# Patient Record
Sex: FEMALE | Race: WHITE | Employment: OTHER | URBAN - METROPOLITAN AREA
[De-identification: names, ages, dates, MRNs, and addresses within clinical notes are randomized per-mention and may not be internally consistent; named-entity substitution may affect disease eponyms.]

---

## 2018-04-25 ENCOUNTER — OFFICE VISIT (OUTPATIENT)
Dept: OBGYN CLINIC | Age: 33
End: 2018-04-25
Payer: COMMERCIAL

## 2018-04-25 VITALS
SYSTOLIC BLOOD PRESSURE: 116 MMHG | DIASTOLIC BLOOD PRESSURE: 74 MMHG | HEIGHT: 67 IN | WEIGHT: 142 LBS | HEART RATE: 78 BPM | BODY MASS INDEX: 22.29 KG/M2

## 2018-04-25 DIAGNOSIS — N91.2 AMENORRHEA: Primary | ICD-10-CM

## 2018-04-25 PROBLEM — L30.9 ECZEMA: Status: ACTIVE | Noted: 2018-04-25

## 2018-04-25 LAB
CONTROL: PRESENT
PREGNANCY TEST URINE, POC: POSITIVE

## 2018-04-25 PROCEDURE — 81025 URINE PREGNANCY TEST: CPT | Performed by: ADVANCED PRACTICE MIDWIFE

## 2018-04-25 PROCEDURE — 99203 OFFICE O/P NEW LOW 30 MIN: CPT | Performed by: ADVANCED PRACTICE MIDWIFE

## 2018-05-03 ENCOUNTER — HOSPITAL ENCOUNTER (EMERGENCY)
Age: 33
Discharge: HOME | End: 2018-05-03
Attending: EMERGENCY MEDICINE
Payer: COMMERCIAL

## 2018-05-03 ENCOUNTER — APPOINTMENT (OUTPATIENT)
Dept: GENERAL RADIOLOGY | Age: 33
End: 2018-05-03
Payer: COMMERCIAL

## 2018-05-03 VITALS
RESPIRATION RATE: 16 BRPM | OXYGEN SATURATION: 98 % | TEMPERATURE: 97.2 F | SYSTOLIC BLOOD PRESSURE: 123 MMHG | HEART RATE: 75 BPM | DIASTOLIC BLOOD PRESSURE: 89 MMHG

## 2018-05-03 DIAGNOSIS — S61.411A LACERATION OF RIGHT HAND WITHOUT FOREIGN BODY, INITIAL ENCOUNTER: Primary | ICD-10-CM

## 2018-05-03 PROCEDURE — 2500000003 HC RX 250 WO HCPCS: Performed by: EMERGENCY MEDICINE

## 2018-05-03 PROCEDURE — 6360000002 HC RX W HCPCS: Performed by: EMERGENCY MEDICINE

## 2018-05-03 PROCEDURE — G0381 LEV 2 HOSP TYPE B ED VISIT: HCPCS

## 2018-05-03 PROCEDURE — 73130 X-RAY EXAM OF HAND: CPT

## 2018-05-03 PROCEDURE — 90471 IMMUNIZATION ADMIN: CPT | Performed by: EMERGENCY MEDICINE

## 2018-05-03 PROCEDURE — 90715 TDAP VACCINE 7 YRS/> IM: CPT | Performed by: EMERGENCY MEDICINE

## 2018-05-03 PROCEDURE — 12001 RPR S/N/AX/GEN/TRNK 2.5CM/<: CPT

## 2018-05-03 RX ORDER — BACITRACIN, NEOMYCIN, POLYMYXIN B 400; 3.5; 5 [USP'U]/G; MG/G; [USP'U]/G
OINTMENT TOPICAL
Qty: 1 TUBE | Refills: 0 | Status: SHIPPED | OUTPATIENT
Start: 2018-05-03 | End: 2018-05-13

## 2018-05-03 RX ORDER — CEPHALEXIN 500 MG/1
500 CAPSULE ORAL 3 TIMES DAILY
Qty: 21 CAPSULE | Refills: 0 | Status: SHIPPED | OUTPATIENT
Start: 2018-05-03 | End: 2018-05-03

## 2018-05-03 RX ORDER — CEPHALEXIN 500 MG/1
500 CAPSULE ORAL 3 TIMES DAILY
Qty: 9 CAPSULE | Refills: 0 | Status: SHIPPED | OUTPATIENT
Start: 2018-05-03 | End: 2018-05-06

## 2018-05-03 RX ORDER — LIDOCAINE HYDROCHLORIDE 10 MG/ML
20 INJECTION, SOLUTION INFILTRATION; PERINEURAL ONCE
Status: COMPLETED | OUTPATIENT
Start: 2018-05-03 | End: 2018-05-03

## 2018-05-03 RX ADMIN — TETANUS TOXOID, REDUCED DIPHTHERIA TOXOID AND ACELLULAR PERTUSSIS VACCINE, ADSORBED 0.5 ML: 5; 2.5; 8; 8; 2.5 SUSPENSION INTRAMUSCULAR at 15:17

## 2018-05-03 RX ADMIN — LIDOCAINE HYDROCHLORIDE 20 ML: 10 INJECTION, SOLUTION INFILTRATION; PERINEURAL at 13:56

## 2018-05-03 ASSESSMENT — ENCOUNTER SYMPTOMS
ABDOMINAL PAIN: 0
SHORTNESS OF BREATH: 0
RHINORRHEA: 0

## 2018-05-03 ASSESSMENT — PAIN SCALES - GENERAL
PAINLEVEL_OUTOF10: 7
PAINLEVEL_OUTOF10: 7

## 2018-05-17 ENCOUNTER — HOSPITAL ENCOUNTER (OUTPATIENT)
Dept: ULTRASOUND IMAGING | Age: 33
Discharge: HOME | End: 2018-05-19
Payer: COMMERCIAL

## 2018-05-17 DIAGNOSIS — N91.2 AMENORRHEA: ICD-10-CM

## 2018-05-17 PROCEDURE — 76801 OB US < 14 WKS SINGLE FETUS: CPT

## 2018-05-22 ENCOUNTER — ROUTINE PRENATAL (OUTPATIENT)
Dept: OBGYN CLINIC | Age: 33
End: 2018-05-22

## 2018-05-22 ENCOUNTER — HOSPITAL ENCOUNTER (OUTPATIENT)
Age: 33
Setting detail: SPECIMEN
Discharge: HOME | End: 2018-05-22
Payer: COMMERCIAL

## 2018-05-22 VITALS
BODY MASS INDEX: 21.93 KG/M2 | WEIGHT: 140 LBS | DIASTOLIC BLOOD PRESSURE: 78 MMHG | HEART RATE: 66 BPM | SYSTOLIC BLOOD PRESSURE: 122 MMHG

## 2018-05-22 DIAGNOSIS — Z34.81 MULTIGRAVIDA IN FIRST TRIMESTER: Primary | ICD-10-CM

## 2018-05-22 DIAGNOSIS — Z3A.01 LESS THAN 8 WEEKS GESTATION OF PREGNANCY: ICD-10-CM

## 2018-05-22 DIAGNOSIS — L30.9 ECZEMA, UNSPECIFIED TYPE: ICD-10-CM

## 2018-05-22 LAB
-: ABNORMAL
ABO/RH: NORMAL
ABSOLUTE EOS #: 0.16 K/UL (ref 0–0.44)
ABSOLUTE IMMATURE GRANULOCYTE: 0.05 K/UL (ref 0–0.3)
ABSOLUTE LYMPH #: 2.46 K/UL (ref 1.1–3.7)
ABSOLUTE MONO #: 1.08 K/UL (ref 0.1–1.2)
AMORPHOUS: ABNORMAL
AMPHETAMINE SCREEN URINE: NEGATIVE
ANTIBODY SCREEN: NEGATIVE
BACTERIA: ABNORMAL
BARBITURATE SCREEN URINE: NEGATIVE
BASOPHILS # BLD: 1 % (ref 0–2)
BASOPHILS ABSOLUTE: 0.09 K/UL (ref 0–0.2)
BENZODIAZEPINE SCREEN, URINE: NEGATIVE
BILIRUBIN URINE: NEGATIVE
BUPRENORPHINE URINE: NORMAL
CANNABINOID SCREEN URINE: NEGATIVE
CASTS UA: ABNORMAL /LPF (ref 0–8)
COCAINE METABOLITE, URINE: NEGATIVE
COLOR: YELLOW
COMMENT UA: ABNORMAL
CRYSTALS, UA: ABNORMAL /HPF
DIFFERENTIAL TYPE: ABNORMAL
EOSINOPHILS RELATIVE PERCENT: 1 % (ref 1–4)
EPITHELIAL CELLS UA: ABNORMAL /HPF (ref 0–5)
GLUCOSE URINE: NEGATIVE
HCT VFR BLD CALC: 39.5 % (ref 36.3–47.1)
HEMOGLOBIN: 12.6 G/DL (ref 11.9–15.1)
HEPATITIS B SURFACE ANTIGEN: NONREACTIVE
HIV AG/AB: NONREACTIVE
IMMATURE GRANULOCYTES: 0 %
KETONES, URINE: NEGATIVE
LEUKOCYTE ESTERASE, URINE: NEGATIVE
LYMPHOCYTES # BLD: 18 % (ref 24–43)
MCH RBC QN AUTO: 29.5 PG (ref 25.2–33.5)
MCHC RBC AUTO-ENTMCNC: 31.9 G/DL (ref 28.4–34.8)
MCV RBC AUTO: 92.5 FL (ref 82.6–102.9)
MDMA URINE: NORMAL
METHADONE SCREEN, URINE: NEGATIVE
METHAMPHETAMINE, URINE: NORMAL
MONOCYTES # BLD: 8 % (ref 3–12)
MUCUS: ABNORMAL
NITRITE, URINE: NEGATIVE
NRBC AUTOMATED: 0 PER 100 WBC
OPIATES, URINE: NEGATIVE
OTHER OBSERVATIONS UA: ABNORMAL
OXYCODONE SCREEN URINE: NEGATIVE
PDW BLD-RTO: 13.2 % (ref 11.8–14.4)
PH UA: 5.5 (ref 5–8)
PHENCYCLIDINE, URINE: NEGATIVE
PLATELET # BLD: ABNORMAL K/UL (ref 138–453)
PLATELET ESTIMATE: ABNORMAL
PLATELET, FLUORESCENCE: 177 K/UL (ref 138–453)
PLATELET, IMMATURE FRACTION: 9.3 % (ref 1.1–10.3)
PMV BLD AUTO: ABNORMAL FL (ref 8.1–13.5)
PROPOXYPHENE, URINE: NORMAL
PROTEIN UA: NEGATIVE
RBC # BLD: 4.27 M/UL (ref 3.95–5.11)
RBC # BLD: ABNORMAL 10*6/UL
RBC UA: ABNORMAL /HPF (ref 0–4)
RENAL EPITHELIAL, UA: ABNORMAL /HPF
RUBV IGG SER QL: 101.3 IU/ML
SEG NEUTROPHILS: 71 % (ref 36–65)
SEGMENTED NEUTROPHILS ABSOLUTE COUNT: 9.57 K/UL (ref 1.5–8.1)
SPECIFIC GRAVITY UA: 1.02 (ref 1–1.03)
T. PALLIDUM, IGG: NONREACTIVE
TEST INFORMATION: NORMAL
TRICHOMONAS: ABNORMAL
TRICYCLIC ANTIDEPRESSANTS, UR: NORMAL
TURBIDITY: ABNORMAL
URINE HGB: ABNORMAL
UROBILINOGEN, URINE: NORMAL
WBC # BLD: 13.4 K/UL (ref 3.5–11.3)
WBC # BLD: ABNORMAL 10*3/UL
WBC UA: ABNORMAL /HPF (ref 0–5)
YEAST: ABNORMAL

## 2018-05-22 PROCEDURE — 0502F SUBSEQUENT PRENATAL CARE: CPT | Performed by: ADVANCED PRACTICE MIDWIFE

## 2018-05-22 RX ORDER — .ALPHA.-TOCOPHEROL ACETATE, DL-, CHOLECALCIFEROL, CYANOCOBALAMIN, FERROUS FUMARATE, FOLIC ACID, NIACINAMIDE, SODIUM ASCORBATE, ASCORBIC ACID, PYRIDOXINE HYDROCHLORIDE, RIBOFLAVIN, AND THIAMINE MONONITRATE 11; 400; 12; 29; 1; 20; 60; 60; 10; 3; 2 [IU]/1; [IU]/1; UG/1; MG/1; MG/1; MG/1; MG/1; MG/1; MG/1; MG/1; MG/1
TABLET, CHEWABLE ORAL
COMMUNITY

## 2018-05-23 ENCOUNTER — TELEPHONE (OUTPATIENT)
Dept: OBGYN CLINIC | Age: 33
End: 2018-05-23

## 2018-05-23 LAB
C. TRACHOMATIS DNA ,URINE: NEGATIVE
CULTURE: NORMAL
CULTURE: NORMAL
Lab: NORMAL
N. GONORRHOEAE DNA, URINE: NEGATIVE
SPECIMEN DESCRIPTION: NORMAL
STATUS: NORMAL

## 2018-06-04 ENCOUNTER — TELEPHONE (OUTPATIENT)
Dept: OBGYN CLINIC | Age: 33
End: 2018-06-04

## 2018-06-19 ENCOUNTER — ROUTINE PRENATAL (OUTPATIENT)
Dept: OBGYN CLINIC | Age: 33
End: 2018-06-19

## 2018-06-19 VITALS
SYSTOLIC BLOOD PRESSURE: 120 MMHG | HEART RATE: 70 BPM | DIASTOLIC BLOOD PRESSURE: 72 MMHG | WEIGHT: 148 LBS | BODY MASS INDEX: 23.18 KG/M2

## 2018-06-19 DIAGNOSIS — O21.9 NAUSEA AND VOMITING DURING PREGNANCY PRIOR TO 22 WEEKS GESTATION: ICD-10-CM

## 2018-06-19 PROCEDURE — 0502F SUBSEQUENT PRENATAL CARE: CPT | Performed by: ADVANCED PRACTICE MIDWIFE

## 2018-07-17 ENCOUNTER — ROUTINE PRENATAL (OUTPATIENT)
Dept: OBGYN CLINIC | Age: 33
End: 2018-07-17

## 2018-07-17 VITALS
WEIGHT: 153 LBS | BODY MASS INDEX: 23.96 KG/M2 | SYSTOLIC BLOOD PRESSURE: 120 MMHG | HEART RATE: 72 BPM | DIASTOLIC BLOOD PRESSURE: 72 MMHG

## 2018-07-17 DIAGNOSIS — Z34.81 MULTIGRAVIDA IN FIRST TRIMESTER: Primary | ICD-10-CM

## 2018-07-17 PROCEDURE — 0502F SUBSEQUENT PRENATAL CARE: CPT | Performed by: ADVANCED PRACTICE MIDWIFE

## 2018-07-17 NOTE — PATIENT INSTRUCTIONS
Patient Education        Learning About Zika Virus  What is Zika virus? Dara Buddle is a type of virus that is spread by mosquitoes. The mosquitoes that carry Dara Buddle are most active during the day but can bite at night. You're more likely to get the virus if you travel to parts of the world where it's more common. This includes parts of [place], [place], [facility], the [place], and the [facility]. Most people infected with Dara Buddle don't have any symptoms. When symptoms are present, they include fever, rash, painful joints, and red eyes. But it can be more serious for women who are pregnant because it can cause birth defects. Experts have found that infection with Zika virus can cause Guillain-Ely syndrome (GBS). But only a small number of people who are infected with Zika virus will get GBS. To learn more  Doctors are quickly learning more about what happens when people are infected with Zika virus. The [facility] and the [address]) have the most current information about Zika virus. If you plan to travel, you can learn about your risk in the area you're traveling to. Contact:  · The CDC at its toll-free phone number (6-309.376.7049) or website (www.cdc.gov/zika/). · Your doctor or local health department. How is it spread? Travelers who have Dara Buddle can spread it when they come home or travel to another area. If they get bitten, they can spread the virus to other mosquitoes. A pregnant woman who gets infected with Dara Buddle can pass it to her unborn baby. Dara Buddle can be spread through sexual contact even if the person does not have symptoms. But it is most often spread through a bite from an infected mosquito. What if you're pregnant or thinking about becoming pregnant? Experts believe that babies born to women who have the virus are at risk for birth defects, including microcephaly (say \"vl-athn-NVV-uh-megha\").  Microcephaly means that the baby's head is smaller than normal. It causes problems acetaminophen, which is Tylenol. Too much acetaminophen (Tylenol) can be harmful. · Get extra rest.  · To prevent dehydration, drink plenty of fluids. Choose water and other caffeine-free clear liquids until you feel better. If you have kidney, heart, or liver disease and have to limit fluids, talk with your doctor before you increase the amount of fluids you drink. How can you prevent Zika virus? There is no vaccine to prevent Zika. But you can protect yourself from mosquito bites, especially when you travel. Remember that the mosquitoes that spread Zika are active during the day. · Wear long pants and long-sleeved shirts. · Use insect repellent with DEET. You can buy it in different strengths up to 100%. Experts suggest that it is safe to use a repellent that contains 10% to 30% DEET on children older than 2 months. · If you are pregnant or breastfeeding and are concerned about using DEET, talk with your doctor. · Spray clothing with DEET. Mosquitoes may bite through thin clothing. (Remember that DEET can harm plastic, such as parts of watches, eyeglass frames, and some fabrics.)  · Sleep under mosquito netting if you sleep during daylight hours. · Use flying-insect spray indoors around sleeping areas. · Do not leave puddles or open containers of water near where you are staying. Mosquitoes breed in standing water. · Avoid areas where there is an outbreak, especially if you are pregnant. If you have been to an area where there is a [place] outbreak, use condoms or do not have sex for at least 2 months for women and 6 months for men. If you do get infected with [place], protect yourself from mosquito bites, especially during the first week. Men should use condoms or not have sex for at least 6 months after symptoms begin. Women should use condoms or not have sex for at least 8 weeks after symptoms begin. This will help prevent the virus from spreading to other people.   Follow-up care is a key part of your

## 2018-07-17 NOTE — PROGRESS NOTES
SUBJECTIVE:  Joe Wyane is here for her return OB visit. She possibly feeling fetal movement. She denies vaginal bleeding. She denies vaginal discharge. She denies leaking of fluid. She denies uterine cramping. She denies nausea and/or vomiting. No concerns today. Did go to [facility] for a visit during the time that they were trying to conceive. And she did have some heat exhaustion symptoms. OBJECTIVE:  Blood pressure 120/72, pulse 72, weight 153 lb (69.4 kg), last menstrual period 03/30/2018. ASSESSMENT:  IUP @ 15 6/7 weeks  S =D  Patient Active Problem List   Diagnosis    Eczema    Multigravida in first trimester    Nausea and vomiting during pregnancy prior to 22 weeks gestation     PLAN:  Joe Wayne will watch for fetal movement. The problem list was reviewed and updated as needed. EDC was established. Labs were not reviewed. NT screening was not and the results were not reviewed with Joe Wayne  Single marker MSAFP was not ordered for the 16-20 wk gestational window  18-22 wk fetal anatomy ultrasound was ordered. She was counseled regarding all of the above    Patient was seen with total face to face time of 15 minutes. More than 50% of this visit was education and counseling.

## 2018-08-14 ENCOUNTER — ROUTINE PRENATAL (OUTPATIENT)
Dept: PERINATAL CARE | Age: 33
End: 2018-08-14
Payer: COMMERCIAL

## 2018-08-14 VITALS
TEMPERATURE: 98.2 F | RESPIRATION RATE: 18 BRPM | BODY MASS INDEX: 25.11 KG/M2 | DIASTOLIC BLOOD PRESSURE: 64 MMHG | SYSTOLIC BLOOD PRESSURE: 118 MMHG | HEART RATE: 90 BPM | WEIGHT: 160 LBS | HEIGHT: 67 IN

## 2018-08-14 DIAGNOSIS — Z3A.19 19 WEEKS GESTATION OF PREGNANCY: ICD-10-CM

## 2018-08-14 DIAGNOSIS — Z36.86 ENCOUNTER FOR SCREENING FOR RISK OF PRE-TERM LABOR: ICD-10-CM

## 2018-08-14 DIAGNOSIS — O44.40 LOW IMPLANTATION OF PLACENTA WITHOUT HEMORRHAGE: Primary | ICD-10-CM

## 2018-08-14 LAB
ABDOMINAL CIRCUMFERENCE: NORMAL CM
BIPARIETAL DIAMETER: NORMAL CM
ESTIMATED FETAL WEIGHT: NORMAL GRAMS
FEMORAL DIAMETER: NORMAL CM
HC/AC: NORMAL
HEAD CIRCUMFERENCE: NORMAL CM

## 2018-08-14 PROCEDURE — 76811 OB US DETAILED SNGL FETUS: CPT | Performed by: OBSTETRICS & GYNECOLOGY

## 2018-08-14 PROCEDURE — 76817 TRANSVAGINAL US OBSTETRIC: CPT | Performed by: OBSTETRICS & GYNECOLOGY

## 2018-08-21 ENCOUNTER — ROUTINE PRENATAL (OUTPATIENT)
Dept: OBGYN CLINIC | Age: 33
End: 2018-08-21

## 2018-08-21 VITALS
DIASTOLIC BLOOD PRESSURE: 76 MMHG | SYSTOLIC BLOOD PRESSURE: 114 MMHG | BODY MASS INDEX: 25.06 KG/M2 | HEART RATE: 66 BPM | WEIGHT: 160 LBS

## 2018-08-21 DIAGNOSIS — Z34.81 MULTIGRAVIDA IN FIRST TRIMESTER: Primary | ICD-10-CM

## 2018-08-21 PROCEDURE — 0502F SUBSEQUENT PRENATAL CARE: CPT | Performed by: ADVANCED PRACTICE MIDWIFE

## 2018-08-21 NOTE — PROGRESS NOTES
SUBJECTIVE:  Miguel A Rai is here for her return OB visit. She reports fetal movement. She denies vaginal bleeding. She denies vaginal discharge. She denies leaking of fluid. She denies uterine contraction activity. She denies nausea and/or vomiting. She denies retaining fluid in her extremities. Is doing well. Had anatomy scan and only low lying placenta. Not finding out sex of the baby. States is having some cramps down the side of her legs. OBJECTIVE:  Blood pressure 114/76, pulse 66, weight 160 lb (72.6 kg), last menstrual period 03/30/2018. ASSESSMENT:  IUP @ 20 4/7 weeks  S = D  Patient Active Problem List   Diagnosis    Eczema    Multigravida in first trimester    Nausea and vomiting during pregnancy prior to 22 weeks gestation    Low implantation of placenta without hemorrhage     PLAN:  Miguel A Rai will monitor fetal movement daily. The problem list was reviewed and updated as needed. Quad screen and or single marker AFP results were not discussed. Labs were not ordered. BMI and appropriate weight gain recommendations were discussed. Normal: BMI < 25: Gain 25-35 lb. Recommended Magnesium    Miguel A Rai was counseled regarding all of the above    Patient was seen with total face to face time of 15 minutes. More than 50% of this  visit was for counseling and education.

## 2018-09-20 ENCOUNTER — HOSPITAL ENCOUNTER (OUTPATIENT)
Age: 33
Setting detail: SPECIMEN
Discharge: HOME | End: 2018-09-20
Payer: COMMERCIAL

## 2018-09-20 ENCOUNTER — ROUTINE PRENATAL (OUTPATIENT)
Dept: OBGYN CLINIC | Age: 33
End: 2018-09-20
Payer: COMMERCIAL

## 2018-09-20 VITALS
SYSTOLIC BLOOD PRESSURE: 114 MMHG | DIASTOLIC BLOOD PRESSURE: 76 MMHG | WEIGHT: 165.5 LBS | BODY MASS INDEX: 25.92 KG/M2 | HEART RATE: 84 BPM

## 2018-09-20 DIAGNOSIS — Z3A.24 24 WEEKS GESTATION OF PREGNANCY: ICD-10-CM

## 2018-09-20 DIAGNOSIS — O26.899 RH NEGATIVE STATE IN ANTEPARTUM PERIOD: ICD-10-CM

## 2018-09-20 DIAGNOSIS — Z67.91 RH NEGATIVE STATE IN ANTEPARTUM PERIOD: ICD-10-CM

## 2018-09-20 DIAGNOSIS — O09.92 SUPERVISION OF HIGH RISK PREGNANCY IN SECOND TRIMESTER: Primary | ICD-10-CM

## 2018-09-20 LAB
ABSOLUTE EOS #: 0.36 K/UL (ref 0–0.44)
ABSOLUTE IMMATURE GRANULOCYTE: 0.08 K/UL (ref 0–0.3)
ABSOLUTE LYMPH #: 1.62 K/UL (ref 1.1–3.7)
ABSOLUTE MONO #: 0.91 K/UL (ref 0.1–1.2)
BASOPHILS # BLD: 1 % (ref 0–2)
BASOPHILS ABSOLUTE: 0.05 K/UL (ref 0–0.2)
DIFFERENTIAL TYPE: ABNORMAL
EOSINOPHILS RELATIVE PERCENT: 4 % (ref 1–4)
GLUCOSE ADMINISTRATION: NORMAL
GLUCOSE TOLERANCE SCREEN 50G: 77 MG/DL (ref 70–135)
HCT VFR BLD CALC: 36.2 % (ref 36.3–47.1)
HEMOGLOBIN: 11.4 G/DL (ref 11.9–15.1)
IMMATURE GRANULOCYTES: 1 %
LYMPHOCYTES # BLD: 16 % (ref 24–43)
MCH RBC QN AUTO: 30.3 PG (ref 25.2–33.5)
MCHC RBC AUTO-ENTMCNC: 31.5 G/DL (ref 28.4–34.8)
MCV RBC AUTO: 96.3 FL (ref 82.6–102.9)
MONOCYTES # BLD: 9 % (ref 3–12)
NRBC AUTOMATED: 0 PER 100 WBC
PDW BLD-RTO: 13 % (ref 11.8–14.4)
PLATELET # BLD: ABNORMAL K/UL (ref 138–453)
PLATELET ESTIMATE: ABNORMAL
PLATELET, FLUORESCENCE: 170 K/UL (ref 138–453)
PLATELET, IMMATURE FRACTION: 12.4 % (ref 1.1–10.3)
PMV BLD AUTO: ABNORMAL FL (ref 8.1–13.5)
RBC # BLD: 3.76 M/UL (ref 3.95–5.11)
RBC # BLD: ABNORMAL 10*6/UL
SEG NEUTROPHILS: 70 % (ref 36–65)
SEGMENTED NEUTROPHILS ABSOLUTE COUNT: 7.19 K/UL (ref 1.5–8.1)
WBC # BLD: 10.2 K/UL (ref 3.5–11.3)
WBC # BLD: ABNORMAL 10*3/UL

## 2018-09-20 PROCEDURE — 0502F SUBSEQUENT PRENATAL CARE: CPT | Performed by: ADVANCED PRACTICE MIDWIFE

## 2018-09-20 PROCEDURE — 90471 IMMUNIZATION ADMIN: CPT | Performed by: ADVANCED PRACTICE MIDWIFE

## 2018-09-20 PROCEDURE — 90688 IIV4 VACCINE SPLT 0.5 ML IM: CPT | Performed by: ADVANCED PRACTICE MIDWIFE

## 2018-09-20 PROCEDURE — 36415 COLL VENOUS BLD VENIPUNCTURE: CPT | Performed by: ADVANCED PRACTICE MIDWIFE

## 2018-09-20 NOTE — PROGRESS NOTES
SUBJECTIVE:  Rosanne Cogan is here for her return OB visit. She reports fetal movement. She denies vaginal bleeding. She denies vaginal discharge. She denies leaking of fluid. She denies uterine contraction activity. She denies nausea and/or vomiting. She denies retaining fluid in her extremities. Pt mentions she is interested in elective IOL. States she had IOL with her first because her  is in the [employer]. States she does not have any family in the area and is really worried about not having anyone to watch her son and be here for support. OBJECTIVE:  Blood pressure 114/76, pulse 84, weight 165 lb 8 oz (75.1 kg), last menstrual period 03/30/2018. Rosanne Cogan has the flu vaccine as appropriate [id number] [address] has not the Tdap vaccine as appropriate, will discuss at [facility] at 28 weeks    [place] score: 0      ASSESSMENT/PLAN:  IUP @ 24+6 weeks  S = D  Low lying placenta  The problem list was reviewed and updated with any new issues from today's visit    Rosanne Cogan will monitor fetal movement daily. 28 week lab panel was discussed and was ordered. And done today  Antibody screen was ordered and discussed pt to obtain a day prior to next visit. At [facility] will received RHOGAM  Warnings reviewed  reviewed midwife policy on elective induction. Expressed understanding to social situation. Agreed to discuss when she is further along and will be assess if she would be favorable  Discussed low lying placenta, has f/u appt  At 28 weeks  Rosanne Cogan was counseled regarding all of the above    Patient was seen with total face to face time of 15 minutes. More than 50% of this visit was for education and counseling.

## 2018-10-12 ENCOUNTER — HOSPITAL ENCOUNTER (OUTPATIENT)
Age: 33
Setting detail: SPECIMEN
Discharge: HOME | End: 2018-10-12
Payer: COMMERCIAL

## 2018-10-12 DIAGNOSIS — O26.899 RH NEGATIVE STATE IN ANTEPARTUM PERIOD: ICD-10-CM

## 2018-10-12 DIAGNOSIS — Z3A.24 24 WEEKS GESTATION OF PREGNANCY: ICD-10-CM

## 2018-10-12 DIAGNOSIS — Z67.91 RH NEGATIVE STATE IN ANTEPARTUM PERIOD: ICD-10-CM

## 2018-10-12 LAB — ANTIBODY SCREEN: NEGATIVE

## 2018-10-16 ENCOUNTER — ROUTINE PRENATAL (OUTPATIENT)
Dept: PERINATAL CARE | Age: 33
End: 2018-10-16
Payer: COMMERCIAL

## 2018-10-16 VITALS
HEART RATE: 83 BPM | SYSTOLIC BLOOD PRESSURE: 103 MMHG | DIASTOLIC BLOOD PRESSURE: 64 MMHG | RESPIRATION RATE: 16 BRPM | HEIGHT: 67 IN | TEMPERATURE: 98 F | WEIGHT: 171 LBS | BODY MASS INDEX: 26.84 KG/M2

## 2018-10-16 DIAGNOSIS — Z36.4 ANTENATAL SCREENING FOR FETAL GROWTH RETARDATION USING ULTRASONICS: ICD-10-CM

## 2018-10-16 DIAGNOSIS — Z13.89 ENCOUNTER FOR ROUTINE SCREENING FOR MALFORMATION USING ULTRASONICS: ICD-10-CM

## 2018-10-16 DIAGNOSIS — Z03.72 SUSPECTED PLACENTAL PROBLEM NOT FOUND: ICD-10-CM

## 2018-10-16 DIAGNOSIS — Z3A.28 28 WEEKS GESTATION OF PREGNANCY: ICD-10-CM

## 2018-10-16 DIAGNOSIS — O44.40 LOW IMPLANTATION OF PLACENTA WITHOUT HEMORRHAGE: Primary | ICD-10-CM

## 2018-10-16 LAB
ABDOMINAL CIRCUMFERENCE: NORMAL CM
ABDOMINAL CIRCUMFERENCE: NORMAL CM
BIPARIETAL DIAMETER: NORMAL CM
BIPARIETAL DIAMETER: NORMAL CM
ESTIMATED FETAL WEIGHT: NORMAL GRAMS
ESTIMATED FETAL WEIGHT: NORMAL GRAMS
FEMORAL DIAMETER: NORMAL CM
FEMORAL DIAMETER: NORMAL CM
HC/AC: NORMAL
HC/AC: NORMAL
HEAD CIRCUMFERENCE: NORMAL CM
HEAD CIRCUMFERENCE: NORMAL CM

## 2018-10-16 PROCEDURE — 76817 TRANSVAGINAL US OBSTETRIC: CPT | Performed by: OBSTETRICS & GYNECOLOGY

## 2018-10-16 PROCEDURE — 76819 FETAL BIOPHYS PROFIL W/O NST: CPT | Performed by: OBSTETRICS & GYNECOLOGY

## 2018-10-16 PROCEDURE — 76805 OB US >/= 14 WKS SNGL FETUS: CPT | Performed by: OBSTETRICS & GYNECOLOGY

## 2018-10-24 ENCOUNTER — ROUTINE PRENATAL (OUTPATIENT)
Dept: OBGYN CLINIC | Age: 33
End: 2018-10-24
Payer: COMMERCIAL

## 2018-10-24 VITALS
HEART RATE: 84 BPM | SYSTOLIC BLOOD PRESSURE: 116 MMHG | WEIGHT: 173 LBS | DIASTOLIC BLOOD PRESSURE: 72 MMHG | BODY MASS INDEX: 27.1 KG/M2

## 2018-10-24 DIAGNOSIS — Z34.93 PRENATAL CARE IN THIRD TRIMESTER: ICD-10-CM

## 2018-10-24 DIAGNOSIS — Z67.91 RH NEGATIVE STATUS DURING PREGNANCY IN THIRD TRIMESTER: ICD-10-CM

## 2018-10-24 DIAGNOSIS — O26.893 RH NEGATIVE STATUS DURING PREGNANCY IN THIRD TRIMESTER: ICD-10-CM

## 2018-10-24 DIAGNOSIS — Z3A.29 29 WEEKS GESTATION OF PREGNANCY: Primary | ICD-10-CM

## 2018-10-24 PROCEDURE — 96372 THER/PROPH/DIAG INJ SC/IM: CPT | Performed by: ADVANCED PRACTICE MIDWIFE

## 2018-10-24 PROCEDURE — 0502F SUBSEQUENT PRENATAL CARE: CPT | Performed by: ADVANCED PRACTICE MIDWIFE

## 2018-10-24 NOTE — PROGRESS NOTES
SUBJECTIVE:  Mee Arriaga is here for her return OB visit. She reports fetal movement. She denies  vaginal bleeding. She denies  vaginal discharge. She denies leaking of fluid. She denies uterine contraction activity. She denies nausea and/or vomiting. She denies retaining fluid in her extremities. Feels tired, but working and taking care of toddler. OBJECTIVE:  Blood pressure 116/72, pulse 84, weight 173 lb (78.5 kg), last menstrual period 2018. ASSESSMENT:  IUP @ 29 weeks  S = D    PLAN:  Mee Arriaga will monitor fetal movement daily. Fetal Kick Count was discussed and explained  The problem list was reviewed and updated as needed. 28 week lab results were reviewed. She did receive RhoGam as needed. Wilmot-Norton contractions vs  labor contractions were reviewed. Mee Arriaga was counseled regarding RhoGam    More than 50% of this 20 min visit was for education and counseling.

## 2018-11-07 ENCOUNTER — ROUTINE PRENATAL (OUTPATIENT)
Dept: OBGYN CLINIC | Age: 33
End: 2018-11-07

## 2018-11-07 VITALS
SYSTOLIC BLOOD PRESSURE: 116 MMHG | DIASTOLIC BLOOD PRESSURE: 76 MMHG | WEIGHT: 175 LBS | HEART RATE: 72 BPM | BODY MASS INDEX: 27.41 KG/M2

## 2018-11-07 DIAGNOSIS — Z3A.31 31 WEEKS GESTATION OF PREGNANCY: Primary | ICD-10-CM

## 2018-11-07 DIAGNOSIS — Z34.93 PRENATAL CARE IN THIRD TRIMESTER: ICD-10-CM

## 2018-11-07 PROCEDURE — 0502F SUBSEQUENT PRENATAL CARE: CPT | Performed by: ADVANCED PRACTICE MIDWIFE

## 2018-11-21 ENCOUNTER — ROUTINE PRENATAL (OUTPATIENT)
Dept: OBGYN CLINIC | Age: 33
End: 2018-11-21

## 2018-11-21 VITALS
SYSTOLIC BLOOD PRESSURE: 110 MMHG | WEIGHT: 176 LBS | BODY MASS INDEX: 27.57 KG/M2 | DIASTOLIC BLOOD PRESSURE: 74 MMHG | HEART RATE: 78 BPM

## 2018-11-21 DIAGNOSIS — Z3A.33 33 WEEKS GESTATION OF PREGNANCY: Primary | ICD-10-CM

## 2018-11-21 PROCEDURE — 0502F SUBSEQUENT PRENATAL CARE: CPT | Performed by: ADVANCED PRACTICE MIDWIFE

## 2018-12-10 ENCOUNTER — ROUTINE PRENATAL (OUTPATIENT)
Dept: OBGYN CLINIC | Age: 33
End: 2018-12-10

## 2018-12-10 ENCOUNTER — HOSPITAL ENCOUNTER (OUTPATIENT)
Age: 33
Setting detail: SPECIMEN
Discharge: HOME | End: 2018-12-10
Payer: COMMERCIAL

## 2018-12-10 VITALS
DIASTOLIC BLOOD PRESSURE: 74 MMHG | SYSTOLIC BLOOD PRESSURE: 122 MMHG | BODY MASS INDEX: 28.04 KG/M2 | HEART RATE: 78 BPM | WEIGHT: 179 LBS

## 2018-12-10 DIAGNOSIS — Z3A.36 36 WEEKS GESTATION OF PREGNANCY: Primary | ICD-10-CM

## 2018-12-10 DIAGNOSIS — Z34.93 PRENATAL CARE IN THIRD TRIMESTER: ICD-10-CM

## 2018-12-10 DIAGNOSIS — Z3A.36 36 WEEKS GESTATION OF PREGNANCY: ICD-10-CM

## 2018-12-10 PROCEDURE — 0502F SUBSEQUENT PRENATAL CARE: CPT | Performed by: ADVANCED PRACTICE MIDWIFE

## 2018-12-13 LAB
CULTURE: ABNORMAL
Lab: ABNORMAL
SPECIMEN DESCRIPTION: ABNORMAL
STATUS: ABNORMAL

## 2018-12-17 ENCOUNTER — ROUTINE PRENATAL (OUTPATIENT)
Dept: OBGYN CLINIC | Age: 33
End: 2018-12-17

## 2018-12-17 VITALS
HEART RATE: 96 BPM | WEIGHT: 179 LBS | SYSTOLIC BLOOD PRESSURE: 124 MMHG | BODY MASS INDEX: 28.04 KG/M2 | DIASTOLIC BLOOD PRESSURE: 76 MMHG

## 2018-12-17 DIAGNOSIS — O99.820 GBS (GROUP B STREPTOCOCCUS CARRIER), +RV CULTURE, CURRENTLY PREGNANT: ICD-10-CM

## 2018-12-17 DIAGNOSIS — Z34.83 ENCOUNTER FOR SUPERVISION OF OTHER NORMAL PREGNANCY IN THIRD TRIMESTER: Primary | ICD-10-CM

## 2018-12-17 DIAGNOSIS — Z3A.37 37 WEEKS GESTATION OF PREGNANCY: ICD-10-CM

## 2018-12-17 PROCEDURE — 0502F SUBSEQUENT PRENATAL CARE: CPT | Performed by: ADVANCED PRACTICE MIDWIFE

## 2018-12-26 ENCOUNTER — ROUTINE PRENATAL (OUTPATIENT)
Dept: OBGYN CLINIC | Age: 33
End: 2018-12-26

## 2018-12-26 VITALS
SYSTOLIC BLOOD PRESSURE: 120 MMHG | HEART RATE: 84 BPM | WEIGHT: 176 LBS | BODY MASS INDEX: 27.57 KG/M2 | DIASTOLIC BLOOD PRESSURE: 66 MMHG

## 2018-12-26 DIAGNOSIS — O99.820 GBS (GROUP B STREPTOCOCCUS CARRIER), +RV CULTURE, CURRENTLY PREGNANT: ICD-10-CM

## 2018-12-26 DIAGNOSIS — Z34.93 PRENATAL CARE IN THIRD TRIMESTER: Primary | ICD-10-CM

## 2018-12-26 DIAGNOSIS — Z3A.38 38 WEEKS GESTATION OF PREGNANCY: ICD-10-CM

## 2018-12-26 PROCEDURE — 0502F SUBSEQUENT PRENATAL CARE: CPT | Performed by: ADVANCED PRACTICE MIDWIFE

## 2018-12-30 ENCOUNTER — APPOINTMENT (OUTPATIENT)
Dept: LABOR AND DELIVERY | Age: 33
End: 2018-12-30
Payer: COMMERCIAL

## 2018-12-30 ENCOUNTER — HOSPITAL ENCOUNTER (INPATIENT)
Age: 33
LOS: 2 days | Discharge: HOME | End: 2019-01-01
Attending: OBSTETRICS & GYNECOLOGY | Admitting: OBSTETRICS & GYNECOLOGY
Payer: COMMERCIAL

## 2018-12-30 PROBLEM — Z34.81 MULTIGRAVIDA IN FIRST TRIMESTER: Status: RESOLVED | Noted: 2018-05-22 | Resolved: 2018-12-30

## 2018-12-30 PROBLEM — Z03.72 SUSPECTED PLACENTAL PROBLEM NOT FOUND: Status: RESOLVED | Noted: 2018-10-16 | Resolved: 2018-12-30

## 2018-12-30 PROBLEM — Z3A.39 39 WEEKS GESTATION OF PREGNANCY: Status: ACTIVE | Noted: 2018-12-30

## 2018-12-30 PROBLEM — O21.9 NAUSEA AND VOMITING DURING PREGNANCY PRIOR TO 22 WEEKS GESTATION: Status: RESOLVED | Noted: 2018-06-19 | Resolved: 2018-12-30

## 2018-12-30 LAB
ABO/RH: NORMAL
ABSOLUTE EOS #: 0.18 K/UL (ref 0–0.44)
ABSOLUTE IMMATURE GRANULOCYTE: 0.13 K/UL (ref 0–0.3)
ABSOLUTE LYMPH #: 2.05 K/UL (ref 1.1–3.7)
ABSOLUTE MONO #: 1.28 K/UL (ref 0.1–1.2)
AMPHETAMINE SCREEN URINE: NEGATIVE
ANTIBODY IDENTIFICATION: NORMAL
ANTIBODY SCREEN: POSITIVE
ARM BAND NUMBER: NORMAL
BARBITURATE SCREEN URINE: NEGATIVE
BASOPHILS # BLD: 1 % (ref 0–2)
BASOPHILS ABSOLUTE: 0.06 K/UL (ref 0–0.2)
BENZODIAZEPINE SCREEN, URINE: NEGATIVE
BUPRENORPHINE URINE: NORMAL
CANNABINOID SCREEN URINE: NEGATIVE
COCAINE METABOLITE, URINE: NEGATIVE
DIFFERENTIAL TYPE: ABNORMAL
EOSINOPHILS RELATIVE PERCENT: 2 % (ref 1–4)
EXPIRATION DATE: NORMAL
HCT VFR BLD CALC: 32.6 % (ref 36.3–47.1)
HEMOGLOBIN: 10.3 G/DL (ref 11.9–15.1)
IMMATURE GRANULOCYTES: 1 %
LYMPHOCYTES # BLD: 17 % (ref 24–43)
MCH RBC QN AUTO: 27.2 PG (ref 25.2–33.5)
MCHC RBC AUTO-ENTMCNC: 31.6 G/DL (ref 28.4–34.8)
MCV RBC AUTO: 86 FL (ref 82.6–102.9)
MDMA URINE: NORMAL
METHADONE SCREEN, URINE: NEGATIVE
METHAMPHETAMINE, URINE: NORMAL
MONOCYTES # BLD: 11 % (ref 3–12)
NRBC AUTOMATED: 0 PER 100 WBC
OPIATES, URINE: NEGATIVE
OXYCODONE SCREEN URINE: NEGATIVE
PDW BLD-RTO: 14.3 % (ref 11.8–14.4)
PHENCYCLIDINE, URINE: NEGATIVE
PLATELET # BLD: ABNORMAL K/UL (ref 138–453)
PLATELET ESTIMATE: ABNORMAL
PLATELET, FLUORESCENCE: 183 K/UL (ref 138–453)
PLATELET, IMMATURE FRACTION: 14 % (ref 1.1–10.3)
PMV BLD AUTO: ABNORMAL FL (ref 8.1–13.5)
PROPOXYPHENE, URINE: NORMAL
RBC # BLD: 3.79 M/UL (ref 3.95–5.11)
RBC # BLD: ABNORMAL 10*6/UL
SEG NEUTROPHILS: 69 % (ref 36–65)
SEGMENTED NEUTROPHILS ABSOLUTE COUNT: 8.07 K/UL (ref 1.5–8.1)
T. PALLIDUM, IGG: NONREACTIVE
TEST INFORMATION: NORMAL
TRICYCLIC ANTIDEPRESSANTS, UR: NORMAL
WBC # BLD: 11.8 K/UL (ref 3.5–11.3)
WBC # BLD: ABNORMAL 10*3/UL

## 2018-12-30 PROCEDURE — 80307 DRUG TEST PRSMV CHEM ANLYZR: CPT

## 2018-12-30 PROCEDURE — 85025 COMPLETE CBC W/AUTO DIFF WBC: CPT

## 2018-12-30 PROCEDURE — 6370000000 HC RX 637 (ALT 250 FOR IP): Performed by: STUDENT IN AN ORGANIZED HEALTH CARE EDUCATION/TRAINING PROGRAM

## 2018-12-30 PROCEDURE — 85055 RETICULATED PLATELET ASSAY: CPT

## 2018-12-30 PROCEDURE — 1220000000 HC SEMI PRIVATE OB R&B

## 2018-12-30 PROCEDURE — 86901 BLOOD TYPING SEROLOGIC RH(D): CPT

## 2018-12-30 PROCEDURE — 86900 BLOOD TYPING SEROLOGIC ABO: CPT

## 2018-12-30 PROCEDURE — 86870 RBC ANTIBODY IDENTIFICATION: CPT

## 2018-12-30 PROCEDURE — 2580000003 HC RX 258: Performed by: STUDENT IN AN ORGANIZED HEALTH CARE EDUCATION/TRAINING PROGRAM

## 2018-12-30 PROCEDURE — 86850 RBC ANTIBODY SCREEN: CPT

## 2018-12-30 PROCEDURE — 86780 TREPONEMA PALLIDUM: CPT

## 2018-12-30 PROCEDURE — 6360000002 HC RX W HCPCS: Performed by: STUDENT IN AN ORGANIZED HEALTH CARE EDUCATION/TRAINING PROGRAM

## 2018-12-30 PROCEDURE — 36415 COLL VENOUS BLD VENIPUNCTURE: CPT

## 2018-12-30 RX ORDER — ONDANSETRON 2 MG/ML
4 INJECTION INTRAMUSCULAR; INTRAVENOUS EVERY 6 HOURS PRN
Status: DISCONTINUED | OUTPATIENT
Start: 2018-12-30 | End: 2018-12-31 | Stop reason: HOSPADM

## 2018-12-30 RX ORDER — SODIUM CHLORIDE, SODIUM LACTATE, POTASSIUM CHLORIDE, CALCIUM CHLORIDE 600; 310; 30; 20 MG/100ML; MG/100ML; MG/100ML; MG/100ML
INJECTION, SOLUTION INTRAVENOUS CONTINUOUS
Status: DISCONTINUED | OUTPATIENT
Start: 2018-12-30 | End: 2018-12-31

## 2018-12-30 RX ORDER — ACETAMINOPHEN 325 MG/1
650 TABLET ORAL EVERY 4 HOURS PRN
Status: DISCONTINUED | OUTPATIENT
Start: 2018-12-30 | End: 2018-12-31 | Stop reason: HOSPADM

## 2018-12-30 RX ORDER — DIPHENHYDRAMINE HCL 25 MG
25 TABLET ORAL EVERY 4 HOURS PRN
Status: DISCONTINUED | OUTPATIENT
Start: 2018-12-30 | End: 2018-12-31 | Stop reason: HOSPADM

## 2018-12-30 RX ORDER — CALCIUM CARBONATE 200(500)MG
1000 TABLET,CHEWABLE ORAL 3 TIMES DAILY PRN
Status: DISCONTINUED | OUTPATIENT
Start: 2018-12-30 | End: 2018-12-31

## 2018-12-30 RX ADMIN — SODIUM CHLORIDE, POTASSIUM CHLORIDE, SODIUM LACTATE AND CALCIUM CHLORIDE: 600; 310; 30; 20 INJECTION, SOLUTION INTRAVENOUS at 19:00

## 2018-12-30 RX ADMIN — ANTACID TABLETS 1000 MG: 500 TABLET, CHEWABLE ORAL at 21:30

## 2018-12-30 RX ADMIN — Medication 5 MILLION UNITS: at 20:01

## 2018-12-30 RX ADMIN — DINOPROSTONE 10 MG: 10 INSERT VAGINAL at 20:34

## 2018-12-31 ENCOUNTER — ANESTHESIA (OUTPATIENT)
Dept: LABOR AND DELIVERY | Age: 33
End: 2018-12-31
Payer: COMMERCIAL

## 2018-12-31 ENCOUNTER — ANESTHESIA EVENT (OUTPATIENT)
Dept: LABOR AND DELIVERY | Age: 33
End: 2018-12-31
Payer: COMMERCIAL

## 2018-12-31 PROCEDURE — 7200000001 HC VAGINAL DELIVERY

## 2018-12-31 PROCEDURE — 6370000000 HC RX 637 (ALT 250 FOR IP): Performed by: STUDENT IN AN ORGANIZED HEALTH CARE EDUCATION/TRAINING PROGRAM

## 2018-12-31 PROCEDURE — 1220000000 HC SEMI PRIVATE OB R&B

## 2018-12-31 PROCEDURE — 59200 INSERT CERVICAL DILATOR: CPT

## 2018-12-31 PROCEDURE — 3E0P7VZ INTRODUCTION OF HORMONE INTO FEMALE REPRODUCTIVE, VIA NATURAL OR ARTIFICIAL OPENING: ICD-10-PCS | Performed by: OBSTETRICS & GYNECOLOGY

## 2018-12-31 PROCEDURE — 96365 THER/PROPH/DIAG IV INF INIT: CPT

## 2018-12-31 PROCEDURE — 6360000002 HC RX W HCPCS: Performed by: ANESTHESIOLOGY

## 2018-12-31 PROCEDURE — 3E033VJ INTRODUCTION OF OTHER HORMONE INTO PERIPHERAL VEIN, PERCUTANEOUS APPROACH: ICD-10-PCS | Performed by: OBSTETRICS & GYNECOLOGY

## 2018-12-31 PROCEDURE — 59400 OBSTETRICAL CARE: CPT | Performed by: ADVANCED PRACTICE MIDWIFE

## 2018-12-31 PROCEDURE — 3700000025 ANESTHESIA EPIDURAL BLOCK: Performed by: ANESTHESIOLOGY

## 2018-12-31 PROCEDURE — 6360000002 HC RX W HCPCS: Performed by: STUDENT IN AN ORGANIZED HEALTH CARE EDUCATION/TRAINING PROGRAM

## 2018-12-31 PROCEDURE — 6360000002 HC RX W HCPCS: Performed by: NURSE ANESTHETIST, CERTIFIED REGISTERED

## 2018-12-31 PROCEDURE — 6360000002 HC RX W HCPCS: Performed by: ADVANCED PRACTICE MIDWIFE

## 2018-12-31 PROCEDURE — 0KQM0ZZ REPAIR PERINEUM MUSCLE, OPEN APPROACH: ICD-10-PCS | Performed by: OBSTETRICS & GYNECOLOGY

## 2018-12-31 PROCEDURE — 96366 THER/PROPH/DIAG IV INF ADDON: CPT

## 2018-12-31 PROCEDURE — 2500000003 HC RX 250 WO HCPCS: Performed by: NURSE ANESTHETIST, CERTIFIED REGISTERED

## 2018-12-31 RX ORDER — ACETAMINOPHEN 500 MG
1000 TABLET ORAL EVERY 6 HOURS PRN
Status: DISCONTINUED | OUTPATIENT
Start: 2018-12-31 | End: 2019-01-01 | Stop reason: HOSPADM

## 2018-12-31 RX ORDER — IBUPROFEN 800 MG/1
800 TABLET ORAL EVERY 6 HOURS PRN
Status: DISCONTINUED | OUTPATIENT
Start: 2018-12-31 | End: 2019-01-01 | Stop reason: HOSPADM

## 2018-12-31 RX ORDER — LANOLIN 100 %
OINTMENT (GRAM) TOPICAL PRN
Status: DISCONTINUED | OUTPATIENT
Start: 2018-12-31 | End: 2019-01-01 | Stop reason: HOSPADM

## 2018-12-31 RX ORDER — IBUPROFEN 800 MG/1
800 TABLET ORAL EVERY 6 HOURS PRN
Qty: 30 TABLET | Refills: 0 | Status: SHIPPED | OUTPATIENT
Start: 2018-12-31

## 2018-12-31 RX ORDER — NALOXONE HYDROCHLORIDE 0.4 MG/ML
0.4 INJECTION, SOLUTION INTRAMUSCULAR; INTRAVENOUS; SUBCUTANEOUS PRN
Status: DISCONTINUED | OUTPATIENT
Start: 2018-12-31 | End: 2018-12-31 | Stop reason: HOSPADM

## 2018-12-31 RX ORDER — SIMETHICONE 80 MG
80 TABLET,CHEWABLE ORAL EVERY 6 HOURS PRN
Status: DISCONTINUED | OUTPATIENT
Start: 2018-12-31 | End: 2019-01-01 | Stop reason: HOSPADM

## 2018-12-31 RX ORDER — PSEUDOEPHEDRINE HCL 30 MG
100 TABLET ORAL 2 TIMES DAILY PRN
Qty: 60 CAPSULE | Refills: 1 | Status: SHIPPED | OUTPATIENT
Start: 2018-12-31

## 2018-12-31 RX ORDER — ONDANSETRON 2 MG/ML
4 INJECTION INTRAMUSCULAR; INTRAVENOUS EVERY 4 HOURS PRN
Status: DISCONTINUED | OUTPATIENT
Start: 2018-12-31 | End: 2019-01-01 | Stop reason: HOSPADM

## 2018-12-31 RX ORDER — SODIUM CHLORIDE, SODIUM LACTATE, POTASSIUM CHLORIDE, CALCIUM CHLORIDE 600; 310; 30; 20 MG/100ML; MG/100ML; MG/100ML; MG/100ML
INJECTION, SOLUTION INTRAVENOUS CONTINUOUS
Status: DISCONTINUED | OUTPATIENT
Start: 2018-12-31 | End: 2019-01-01 | Stop reason: HOSPADM

## 2018-12-31 RX ORDER — DOCUSATE SODIUM 100 MG/1
100 CAPSULE, LIQUID FILLED ORAL 2 TIMES DAILY
Status: DISCONTINUED | OUTPATIENT
Start: 2018-12-31 | End: 2019-01-01 | Stop reason: HOSPADM

## 2018-12-31 RX ORDER — FAMOTIDINE 20 MG/1
20 TABLET, FILM COATED ORAL 2 TIMES DAILY
Status: DISCONTINUED | OUTPATIENT
Start: 2018-12-31 | End: 2018-12-31

## 2018-12-31 RX ORDER — LIDOCAINE HYDROCHLORIDE AND EPINEPHRINE 15; 5 MG/ML; UG/ML
INJECTION, SOLUTION EPIDURAL PRN
Status: DISCONTINUED | OUTPATIENT
Start: 2018-12-31 | End: 2018-12-31 | Stop reason: SDUPTHER

## 2018-12-31 RX ORDER — NALBUPHINE HCL 10 MG/ML
5 AMPUL (ML) INJECTION EVERY 4 HOURS PRN
Status: DISCONTINUED | OUTPATIENT
Start: 2018-12-31 | End: 2018-12-31 | Stop reason: HOSPADM

## 2018-12-31 RX ORDER — ROPIVACAINE HYDROCHLORIDE 2 MG/ML
INJECTION, SOLUTION EPIDURAL; INFILTRATION; PERINEURAL CONTINUOUS PRN
Status: DISCONTINUED | OUTPATIENT
Start: 2018-12-31 | End: 2018-12-31 | Stop reason: SDUPTHER

## 2018-12-31 RX ORDER — ROPIVACAINE HYDROCHLORIDE 2 MG/ML
INJECTION, SOLUTION EPIDURAL; INFILTRATION; PERINEURAL PRN
Status: DISCONTINUED | OUTPATIENT
Start: 2018-12-31 | End: 2018-12-31 | Stop reason: SDUPTHER

## 2018-12-31 RX ORDER — ROPIVACAINE HYDROCHLORIDE 2 MG/ML
INJECTION, SOLUTION EPIDURAL; INFILTRATION; PERINEURAL
Status: COMPLETED
Start: 2018-12-31 | End: 2018-12-31

## 2018-12-31 RX ADMIN — ACETAMINOPHEN 1000 MG: 500 TABLET ORAL at 23:25

## 2018-12-31 RX ADMIN — ROPIVACAINE HYDROCHLORIDE 5 ML: 2 INJECTION, SOLUTION EPIDURAL; INFILTRATION at 12:32

## 2018-12-31 RX ADMIN — FAMOTIDINE 20 MG: 20 TABLET, FILM COATED ORAL at 01:01

## 2018-12-31 RX ADMIN — Medication 2.5 MILLION UNITS: at 12:00

## 2018-12-31 RX ADMIN — IBUPROFEN 800 MG: 800 TABLET ORAL at 18:46

## 2018-12-31 RX ADMIN — Medication 1 MILLI-UNITS/MIN: at 09:58

## 2018-12-31 RX ADMIN — Medication 2.5 MILLION UNITS: at 00:05

## 2018-12-31 RX ADMIN — Medication 2.5 MILLION UNITS: at 03:51

## 2018-12-31 RX ADMIN — DOCUSATE SODIUM 100 MG: 100 CAPSULE, LIQUID FILLED ORAL at 23:25

## 2018-12-31 RX ADMIN — ROPIVACAINE HYDROCHLORIDE 8 ML/HR: 2 INJECTION, SOLUTION EPIDURAL; INFILTRATION at 12:37

## 2018-12-31 RX ADMIN — LIDOCAINE HYDROCHLORIDE,EPINEPHRINE BITARTRATE 3 ML: 15; .005 INJECTION, SOLUTION EPIDURAL; INFILTRATION; INTRACAUDAL; PERINEURAL at 12:31

## 2018-12-31 RX ADMIN — Medication 2.5 MILLION UNITS: at 08:30

## 2018-12-31 RX ADMIN — Medication 8 ML/HR: at 13:00

## 2018-12-31 RX ADMIN — ROPIVACAINE HYDROCHLORIDE 5 ML: 2 INJECTION, SOLUTION EPIDURAL; INFILTRATION at 12:35

## 2018-12-31 RX ADMIN — BENZOCAINE AND LEVOMENTHOL: 200; 5 SPRAY TOPICAL at 23:25

## 2018-12-31 RX ADMIN — FAMOTIDINE 20 MG: 20 TABLET, FILM COATED ORAL at 10:00

## 2018-12-31 ASSESSMENT — PAIN SCALES - GENERAL
PAINLEVEL_OUTOF10: 3
PAINLEVEL_OUTOF10: 4

## 2019-01-01 VITALS
HEIGHT: 67 IN | DIASTOLIC BLOOD PRESSURE: 76 MMHG | HEART RATE: 77 BPM | SYSTOLIC BLOOD PRESSURE: 112 MMHG | RESPIRATION RATE: 16 BRPM | BODY MASS INDEX: 27.62 KG/M2 | TEMPERATURE: 98.3 F | WEIGHT: 176 LBS

## 2019-01-01 PROBLEM — O44.40 LOW IMPLANTATION OF PLACENTA WITHOUT HEMORRHAGE: Status: RESOLVED | Noted: 2018-08-14 | Resolved: 2019-01-01

## 2019-01-01 PROBLEM — Z3A.39 39 WEEKS GESTATION OF PREGNANCY: Status: RESOLVED | Noted: 2018-12-30 | Resolved: 2019-01-01

## 2019-01-01 LAB — FETAL SCREEN: NORMAL

## 2019-01-01 PROCEDURE — 85461 HEMOGLOBIN FETAL: CPT

## 2019-01-01 PROCEDURE — 6360000002 HC RX W HCPCS: Performed by: STUDENT IN AN ORGANIZED HEALTH CARE EDUCATION/TRAINING PROGRAM

## 2019-01-01 PROCEDURE — 36415 COLL VENOUS BLD VENIPUNCTURE: CPT

## 2019-01-01 PROCEDURE — 6370000000 HC RX 637 (ALT 250 FOR IP): Performed by: STUDENT IN AN ORGANIZED HEALTH CARE EDUCATION/TRAINING PROGRAM

## 2019-01-01 PROCEDURE — 96372 THER/PROPH/DIAG INJ SC/IM: CPT

## 2019-01-01 RX ADMIN — IBUPROFEN 800 MG: 800 TABLET ORAL at 16:43

## 2019-01-01 RX ADMIN — IBUPROFEN 800 MG: 800 TABLET ORAL at 08:02

## 2019-01-01 RX ADMIN — DOCUSATE SODIUM 100 MG: 100 CAPSULE, LIQUID FILLED ORAL at 08:02

## 2019-01-01 RX ADMIN — IBUPROFEN 800 MG: 800 TABLET ORAL at 02:04

## 2019-01-01 RX ADMIN — HUMAN RHO(D) IMMUNE GLOBULIN 300 MCG: 1500 SOLUTION INTRAMUSCULAR; INTRAVENOUS at 10:53

## 2019-01-01 ASSESSMENT — PAIN SCALES - GENERAL
PAINLEVEL_OUTOF10: 3
PAINLEVEL_OUTOF10: 2
PAINLEVEL_OUTOF10: 3

## 2019-01-02 LAB
BLD PROD TYP BPU: NORMAL
DISPENSE STATUS BLOOD BANK: NORMAL
TRANSFUSION STATUS: NORMAL
UNIT DIVISION: 0
UNIT NUMBER: NORMAL

## 2019-01-14 ENCOUNTER — POSTPARTUM VISIT (OUTPATIENT)
Dept: OBGYN CLINIC | Age: 34
End: 2019-01-14

## 2019-01-14 VITALS
WEIGHT: 156 LBS | HEART RATE: 72 BPM | HEIGHT: 67 IN | BODY MASS INDEX: 24.48 KG/M2 | SYSTOLIC BLOOD PRESSURE: 106 MMHG | DIASTOLIC BLOOD PRESSURE: 82 MMHG

## 2019-01-14 PROCEDURE — 99024 POSTOP FOLLOW-UP VISIT: CPT | Performed by: ADVANCED PRACTICE MIDWIFE

## 2019-02-11 ENCOUNTER — POSTPARTUM VISIT (OUTPATIENT)
Dept: OBGYN CLINIC | Age: 34
End: 2019-02-11
Payer: COMMERCIAL

## 2019-02-11 VITALS
SYSTOLIC BLOOD PRESSURE: 120 MMHG | BODY MASS INDEX: 23.54 KG/M2 | HEIGHT: 67 IN | WEIGHT: 150 LBS | HEART RATE: 84 BPM | DIASTOLIC BLOOD PRESSURE: 78 MMHG

## 2019-02-11 PROBLEM — O99.820 GBS (GROUP B STREPTOCOCCUS CARRIER), +RV CULTURE, CURRENTLY PREGNANT: Status: RESOLVED | Noted: 2018-12-17 | Resolved: 2019-02-11

## 2019-02-11 PROCEDURE — 0503F POSTPARTUM CARE VISIT: CPT | Performed by: ADVANCED PRACTICE MIDWIFE

## 2019-07-09 ENCOUNTER — HOSPITAL ENCOUNTER (OUTPATIENT)
Age: 34
Setting detail: SPECIMEN
Discharge: HOME | End: 2019-07-09
Payer: COMMERCIAL

## 2019-07-10 LAB
ALBUMIN SERPL-MCNC: 4.6 G/DL (ref 3.5–5.2)
ALBUMIN/GLOBULIN RATIO: 1.5 (ref 1–2.5)
ALP BLD-CCNC: 62 U/L (ref 35–104)
ALT SERPL-CCNC: 13 U/L (ref 5–33)
ANION GAP SERPL CALCULATED.3IONS-SCNC: 14 MMOL/L (ref 9–17)
AST SERPL-CCNC: 19 U/L
BILIRUB SERPL-MCNC: 0.43 MG/DL (ref 0.3–1.2)
BUN BLDV-MCNC: 15 MG/DL (ref 6–20)
BUN/CREAT BLD: ABNORMAL (ref 9–20)
CALCIUM SERPL-MCNC: 9.4 MG/DL (ref 8.6–10.4)
CHLORIDE BLD-SCNC: 103 MMOL/L (ref 98–107)
CHOLESTEROL, FASTING: 189 MG/DL
CHOLESTEROL/HDL RATIO: 2.8
CO2: 23 MMOL/L (ref 20–31)
CREAT SERPL-MCNC: 0.56 MG/DL (ref 0.5–0.9)
GFR AFRICAN AMERICAN: >60 ML/MIN
GFR NON-AFRICAN AMERICAN: >60 ML/MIN
GFR SERPL CREATININE-BSD FRML MDRD: ABNORMAL ML/MIN/{1.73_M2}
GFR SERPL CREATININE-BSD FRML MDRD: ABNORMAL ML/MIN/{1.73_M2}
GLUCOSE FASTING: 63 MG/DL (ref 70–99)
HDLC SERPL-MCNC: 67 MG/DL
LDL CHOLESTEROL: 114 MG/DL (ref 0–130)
POTASSIUM SERPL-SCNC: 4.3 MMOL/L (ref 3.7–5.3)
SODIUM BLD-SCNC: 140 MMOL/L (ref 135–144)
TOTAL PROTEIN: 7.6 G/DL (ref 6.4–8.3)
TRIGLYCERIDE, FASTING: 38 MG/DL
TSH SERPL DL<=0.05 MIU/L-ACNC: 6.37 MIU/L (ref 0.3–5)
VLDLC SERPL CALC-MCNC: NORMAL MG/DL (ref 1–30)

## 2019-07-22 ENCOUNTER — HOSPITAL ENCOUNTER (OUTPATIENT)
Age: 34
Setting detail: SPECIMEN
Discharge: HOME | End: 2019-07-22
Payer: COMMERCIAL

## 2019-07-22 LAB
THYROXINE, FREE: 0.86 NG/DL (ref 0.93–1.7)
TSH SERPL DL<=0.05 MIU/L-ACNC: 11.07 MIU/L (ref 0.3–5)
VITAMIN D 25-HYDROXY: 38.6 NG/ML (ref 30–100)

## 2020-10-29 ENCOUNTER — OFFICE VISIT (OUTPATIENT)
Dept: OBGYN CLINIC | Age: 35
End: 2020-10-29
Payer: COMMERCIAL

## 2020-10-29 ENCOUNTER — HOSPITAL ENCOUNTER (OUTPATIENT)
Age: 35
Setting detail: SPECIMEN
Discharge: HOME | End: 2020-10-29
Payer: COMMERCIAL

## 2020-10-29 VITALS
DIASTOLIC BLOOD PRESSURE: 80 MMHG | TEMPERATURE: 98.1 F | HEART RATE: 73 BPM | WEIGHT: 148.6 LBS | BODY MASS INDEX: 23.32 KG/M2 | SYSTOLIC BLOOD PRESSURE: 121 MMHG | HEIGHT: 67 IN

## 2020-10-29 PROBLEM — E03.9 HYPOTHYROID: Status: ACTIVE | Noted: 2020-10-29

## 2020-10-29 PROCEDURE — 99395 PREV VISIT EST AGE 18-39: CPT | Performed by: ADVANCED PRACTICE MIDWIFE

## 2020-10-29 NOTE — PROGRESS NOTES
Date of Visit:  2020  Patient :  1985     Subjective:     CHIEF COMPLAINT:     Chief Complaint   Patient presents with    Annual Exam     Last Pap:  in [place] normal        HPI  Here for annual exam. Has no gyn concerns. Is using condoms for family planning. Her bowel habits are regular. She denies any bloating. She denies dysuria. She denies urinary leaking. She denies vaginal discharge. She is sexually active with single partner, contraception - condoms. Baby is doing well. Family doing well during [facility]  Her sister was diagnosed with cervical cancer recently. Unsure of staging at this point. She is concerned because she is past due on her pap. Handling stress of her dx well. States taking day by day. Depression  2 question Screen:  Over the past two weeks, has the patient felt down,depressed or hopeless? No  Over the past two weeks, has the patient felt little interest or pleasure in doing things? No    PREVENTIVE HEALTH SCREENING:   Date of last pap: > 3 years per pt and neg                  HPV typing/date :unknown date but has had +HPV when younger    Abnormal pap smear history: yes    Date of last mammogram: n/a   Date of last DEXA scan: n/a  Date of last colonoscopy: n/a    History of Gestational Diabetes: No    Preventive screening: Yes with PCP    Family history of Breast, Ovarian, Colon or Uterine Cancer:  neg       Objective:   GYNECOLOGIC HISTORY:     LMP:  Patient's last menstrual period was 10/21/2020 (exact date).   Monthly menses (days): 28-30  Length: 4-6  Flow: moderate, light    Menopause: n/a  Hormone Replacement: no    Sexually active: Yes    STD history: Yes, HPV genital warts    Birth control method :No  Permanent Sterilization: No    SOCIALHISTORY:  Seat Belt Use: Yes  Domestic Violence: No    Counseling: No  Regular exercise: Yes   Counseling:No     Diet discussed: Yes    Social History     Tobacco Use   Smoking Status Never Smoker   Smokeless Tobacco Never Used     Social History     Substance and Sexual Activity   Alcohol Use Yes    Comment: social     Social History     Substance and Sexual Activity   Drug Use No       Current Outpatient Medications   Medication Sig Dispense Refill    ibuprofen (ADVIL;MOTRIN) 800 MG tablet Take 1 tablet by mouth every 6 hours as needed for Pain 30 tablet 0    docusate sodium (COLACE, DULCOLAX) 100 MG CAPS Take 100 mg by mouth 2 times daily as needed for Constipation 60 capsule 1    PRENATA 29-1 MG CHEW Take by mouth       No current facility-administered medications for this visit. REVIEW OF SYSTEMS:  Review of Systems   Constitutional: Negative. HENT: Negative. Eyes: Negative. Respiratory: Negative. Cardiovascular: Negative. Gastrointestinal: Negative. Endocrine: Negative. Genitourinary: Negative. Musculoskeletal: Negative. Skin: Negative. Allergic/Immunologic: Negative. Neurological: Negative. Hematological: Negative. Psychiatric/Behavioral: Negative. Physical Exam:     Constitutional:   Blood pressure 121/80, pulse 73, temperature 98.1 °F (36.7 °C), temperature source Temporal, height 5' 7\" (1.702 m), weight 148 lb 9.6 oz (67.4 kg), last menstrual period 10/21/2020, unknown if currently breastfeeding. Wt Readings from Last 3 Encounters:   10/29/20 148 lb 9.6 oz (67.4 kg)   02/11/19 150 lb (68 kg)   01/14/19 156 lb (70.8 kg)       General Appearance: This is a well-developed, well-nourished and well-groomed female    Skin:  Inspection of the skin revealed no rashes or lesions    Neck and EENT:  No eye discharge and sclera non-icteric  Lips, teeth and gums without lesions and normal dentition  Nares are patent without discharge  Normal external ears are present with no hearing loss  The neck was supple with full range of motion and no masses. The thyroid was not enlarged and had no masses. No enlarged cervical lymph nodes    Respiratory:   The lungs were clear to auscultation bilaterally. There were no rales, rhonchi or wheezes. There was good respiratory effort. Cardiovascular: The heart was in a regular rhythm and rate was normal.  No murmur or extra sounds were noted. Breast:  The breasts are normal size and symmetrical.  There are no skin changes with position changes. The nipples are without deviations or discharge. No masses were palpated. No axillary or supraclavicular lymphadenopathy is present. Back:  Straight with no CVA tenderness present    Abdomen: The abdomen is soft and non-tender. There was no guarding, rebound or rigidity. The bladder was without fullness or tenderness. No hernias were appreciated. Pelvic: The external genitalia has a normal appearance without masses or lesions. There is no inguinal lymphadenopathy. Bladder/urethra without discharge or mass. Normal urethra. The vagina is pink and well rugated. The cervix is without lesions or discharge and with no CMT. Pap was obtained without difficulty. The uterus is midline, mobile, normal size/shape and non-tender. The adnexa are without masses or tenderness. Rectum is normal.    Musculoskeletal: Normal gait. No contractions with normal movement of all extremities. No cyanosis or edema present    Psychiatric:  Alert, oriented to time, place, person and situation. There are no mood or affect changes. ASSESSMENT/PLAN:     Routine annual gynecological exam  .1. Women's annual routine gynecological examination  - PAP SMEAR; Future with cotesting  -Declines birth control and STD screening    2. Hypothyroidism, unspecified type  -f/u with PCP    3. Family history of cervical cancer  -Reviewed HPV and cervical cancer screening recommendations. All questions addressed        Counseling Completed:    discussed need for repeat pap as per American Society for Colposcopy and Cervical Pathology guidelines.   discussed need for mammograms every 1 year, If >44 yo and last mammogram was negative. discussed birth control and barrier recommendations. discussed STD counseling and prevention. discussed Gardisil counseling for all patients 9-25 yo. Hereditary Breast, Ovarian, Colon and Uterine Cancer screening discussed. Tobacco & Secondary smoke risks discussed; with recommendation for cessation and avoidance. Routine health maintenance per patients PCP discussed. Patient was seen with total face to face time of /25 minutes. More than 50% of this visit was counseling and education regarding    Diagnosis Orders   1. Women's annual routine gynecological examination  PAP SMEAR   2. Hypothyroidism, unspecified type     3.  Family history of cervical cancer         Electronically signed by Antoni Lu on 10/29/20 at 12:05 PM EDT

## 2020-11-02 PROBLEM — Z80.49 FAMILY HISTORY OF CERVICAL CANCER: Status: ACTIVE | Noted: 2020-11-02

## 2020-11-02 ASSESSMENT — ENCOUNTER SYMPTOMS
RESPIRATORY NEGATIVE: 1
EYES NEGATIVE: 1
ALLERGIC/IMMUNOLOGIC NEGATIVE: 1
GASTROINTESTINAL NEGATIVE: 1

## 2020-11-03 LAB
HPV SOURCE: NORMAL
HPV, GENOTYPE 16: NOT DETECTED
HPV, GENOTYPE 18: NOT DETECTED
HPV, HIGH RISK OTHER: NOT DETECTED

## 2020-11-05 LAB — CYTOLOGY REPORT: NORMAL

## 2020-11-11 ENCOUNTER — NURSE ONLY (OUTPATIENT)
Dept: OBGYN CLINIC | Age: 35
End: 2020-11-11
Payer: COMMERCIAL

## 2020-11-11 VITALS — TEMPERATURE: 98 F

## 2020-11-11 PROCEDURE — 90471 IMMUNIZATION ADMIN: CPT | Performed by: ADVANCED PRACTICE MIDWIFE

## 2020-11-11 PROCEDURE — 90686 IIV4 VACC NO PRSV 0.5 ML IM: CPT | Performed by: ADVANCED PRACTICE MIDWIFE

## 2020-11-11 NOTE — PROGRESS NOTES
Pt in office for flu vaccine. Have you had an allergic reaction to the flu (influenza) shot? no  Are you allergic to eggs or any component of the flu vaccine? no  Do you have a history of Guillain-Butler Syndrome (GBS), which is paralysis after receiving the flu vaccine? no  Are you feeling well today? Yes  Flu vaccine given as ordered. Patient tolerated it well. No questions re: VIS information.

## 2022-01-04 ENCOUNTER — HOSPITAL ENCOUNTER (OUTPATIENT)
Age: 37
Setting detail: SPECIMEN
Discharge: HOME | End: 2022-01-04

## 2022-01-04 DIAGNOSIS — Z20.822 EXPOSURE TO COVID-19 VIRUS: Primary | ICD-10-CM

## 2022-01-04 DIAGNOSIS — Z20.822 EXPOSURE TO COVID-19 VIRUS: ICD-10-CM

## 2022-01-24 NOTE — PROGRESS NOTES
Pt here today to complete BRCA testing. Patient did all of the pre testing counseling then completed the Myrisk saliva test. Patient offered appointment with Bassem Solorio but declined at this time.

## 2022-01-25 ENCOUNTER — NURSE ONLY (OUTPATIENT)
Dept: OBGYN CLINIC | Age: 37
End: 2022-01-25
Payer: COMMERCIAL

## 2022-01-25 VITALS — DIASTOLIC BLOOD PRESSURE: 73 MMHG | HEART RATE: 78 BPM | SYSTOLIC BLOOD PRESSURE: 109 MMHG

## 2022-01-25 DIAGNOSIS — Z80.49 FAMILY HISTORY OF CERVICAL CANCER: Primary | ICD-10-CM

## 2022-01-25 DIAGNOSIS — Z80.3 FAMILY HISTORY OF BREAST CANCER: ICD-10-CM

## 2022-01-25 PROCEDURE — 36415 COLL VENOUS BLD VENIPUNCTURE: CPT | Performed by: ADVANCED PRACTICE MIDWIFE

## 2023-05-24 ENCOUNTER — TELEPHONE (OUTPATIENT)
Dept: OBGYN CLINIC | Age: 38
End: 2023-05-24

## 2023-08-04 ENCOUNTER — OFFICE VISIT (OUTPATIENT)
Dept: OBGYN CLINIC | Age: 38
End: 2023-08-04
Payer: COMMERCIAL

## 2023-08-04 VITALS
WEIGHT: 151 LBS | BODY MASS INDEX: 23.7 KG/M2 | DIASTOLIC BLOOD PRESSURE: 68 MMHG | HEIGHT: 67 IN | SYSTOLIC BLOOD PRESSURE: 112 MMHG

## 2023-08-04 DIAGNOSIS — E34.9 HORMONE IMBALANCE: Primary | ICD-10-CM

## 2023-08-04 DIAGNOSIS — Z80.3 FAMILY HISTORY OF BREAST CANCER IN MOTHER: ICD-10-CM

## 2023-08-04 PROBLEM — L30.9 ECZEMA: Status: RESOLVED | Noted: 2018-04-25 | Resolved: 2023-08-04

## 2023-08-04 PROBLEM — E03.9 HYPOTHYROID: Status: RESOLVED | Noted: 2020-10-29 | Resolved: 2023-08-04

## 2023-08-04 PROCEDURE — 99213 OFFICE O/P EST LOW 20 MIN: CPT | Performed by: ADVANCED PRACTICE MIDWIFE

## 2023-08-04 ASSESSMENT — ENCOUNTER SYMPTOMS: RESPIRATORY NEGATIVE: 1

## 2023-08-04 NOTE — PROGRESS NOTES
Patient is here to discuss hormone issues. Patient states that she gets very tired but thyroids are normal. Patient states she had 2 periods a month from February-May.
Pleasant. Skin: Skin color, texture, turgor normal. No rashes or lesions. Musculoskeletal: No joint swelling, deformity, no gross abnormalities. Extremities: Non-tender BLE and non-edematous. Psych: Oriented to time, place and person, mood and affect are within normal limits. ASSESSMENT/PLAN:  1. Hormone imbalance  - Discussed potential for Perimenopause with normal labs  - She is interested in hormonal manipulation; desires BHT. Advised of recommendation against use given no studies and verbalizes understanding but still desires to trial pending lab work  - [id number] [facility] [place], operated by [facility]; Future  - Prolactin; Future  - T4, Free; Future  - TSH; Future  - Testosterone Free & Bio, Total; Future  - Estradiol; Future  - Progesterone; Future  - Thyroid Antibodies; Future    2. Family history of breast cancer in mother  - Negative Myriad testing      The patient, Dave Mares,  was seen with a total time spent of 20 minutes for the visit on this date of service by the [facility]  The time component involved both face-to-face (counseling and education) and non face-to-face time (care coordination), spent in determining the total time component.      On this date August 4, 2023,  I have spent greater than 50% of this visit:  [x] Reviewing previous notes/pre-charting  [x] Reviewing test results  [] Performing necessary physical exam/evaluation  [x] Ordering medications, tests, procedures  [] Placing referrals or communicating with other HCP  [x] Documenting and updating Problem List as necessary  [x] Importance of compliance with treatment plan discussed  [x] Counseling patient/support person  [] Coordinating care

## 2023-08-07 ENCOUNTER — HOSPITAL ENCOUNTER (OUTPATIENT)
Age: 38
Discharge: HOME | End: 2023-08-07
Payer: COMMERCIAL

## 2023-08-07 DIAGNOSIS — E34.9 HORMONE IMBALANCE: ICD-10-CM

## 2023-08-07 LAB
ESTRADIOL LEVEL: 64.2 PG/ML (ref 27–314)
FSH SERPL-ACNC: 4.4 MIU/ML (ref 1.7–21.5)
PROGEST SERPL-MCNC: 8.22 NG/ML
PROLACTIN SERPL-MCNC: 7.69 NG/ML (ref 4.79–23.3)
SHBG SERPL-SCNC: 44 NMOL/L (ref 30–135)
T4 FREE SERPL-MCNC: 1.3 NG/DL (ref 0.9–1.7)
TESTOST FREE MFR SERPL: 1.9 PG/ML (ref 1.3–9.2)
TESTOST SERPL-MCNC: 13 NG/DL (ref 20–70)
TESTOSTERONE, BIOAVAILABLE: 4.5 NG/DL (ref 4.1–25.5)
TSH SERPL DL<=0.05 MIU/L-ACNC: 2.42 UIU/ML (ref 0.3–5)

## 2023-08-07 PROCEDURE — 84146 ASSAY OF PROLACTIN: CPT

## 2023-08-07 PROCEDURE — 82670 ASSAY OF TOTAL ESTRADIOL: CPT

## 2023-08-07 PROCEDURE — 83001 ASSAY OF GONADOTROPIN (FSH): CPT

## 2023-08-07 PROCEDURE — 84270 ASSAY OF SEX HORMONE GLOBUL: CPT

## 2023-08-07 PROCEDURE — 84443 ASSAY THYROID STIM HORMONE: CPT

## 2023-08-07 PROCEDURE — 84403 ASSAY OF TOTAL TESTOSTERONE: CPT

## 2023-08-07 PROCEDURE — 84439 ASSAY OF FREE THYROXINE: CPT

## 2023-08-07 PROCEDURE — 84144 ASSAY OF PROGESTERONE: CPT

## 2023-08-07 PROCEDURE — 86800 THYROGLOBULIN ANTIBODY: CPT

## 2023-08-07 PROCEDURE — 36415 COLL VENOUS BLD VENIPUNCTURE: CPT

## 2023-08-07 PROCEDURE — 86376 MICROSOMAL ANTIBODY EACH: CPT

## 2023-08-10 LAB
THYROGLOBULIN AB: 45 IU/ML (ref 0–40)
THYROPEROXIDASE AB SERPL IA-ACNC: 543 IU/ML (ref 0–25)

## 2023-08-23 ENCOUNTER — TELEPHONE (OUTPATIENT)
Dept: OBGYN CLINIC | Age: 38
End: 2023-08-23

## 2023-08-23 NOTE — TELEPHONE ENCOUNTER
Called and reviewed lab results with Carollee Romberg  Will start with Progesterone 50 mg, days 14-18 with 2 refills  They will reach out to Segundo Calero when ready

## 2023-10-19 ENCOUNTER — TELEPHONE (OUTPATIENT)
Dept: OBGYN CLINIC | Age: 38
End: 2023-10-19

## 2023-10-19 NOTE — TELEPHONE ENCOUNTER
KELLY PERDOMO Incoming Phone Call from Current Patient    Patient Request: labs to be ordered before she schedules a follow up appt with you      Patient Symptoms: n/a      Last annual visit: 10/29/2020      Last provider visit: 8/4/2023         Can this concern wait 24-48hrs to be addressed?  Yes